# Patient Record
Sex: MALE | Race: WHITE | NOT HISPANIC OR LATINO | Employment: UNEMPLOYED | URBAN - NONMETROPOLITAN AREA
[De-identification: names, ages, dates, MRNs, and addresses within clinical notes are randomized per-mention and may not be internally consistent; named-entity substitution may affect disease eponyms.]

---

## 2017-08-25 ENCOUNTER — TELEPHONE (OUTPATIENT)
Dept: PAIN MEDICINE | Facility: CLINIC | Age: 27
End: 2017-08-25

## 2022-10-31 ENCOUNTER — APPOINTMENT (OUTPATIENT)
Dept: GENERAL RADIOLOGY | Facility: HOSPITAL | Age: 32
End: 2022-10-31

## 2022-10-31 ENCOUNTER — HOSPITAL ENCOUNTER (EMERGENCY)
Facility: HOSPITAL | Age: 32
Discharge: HOME | End: 2022-10-31
Admitting: STUDENT IN AN ORGANIZED HEALTH CARE EDUCATION/TRAINING PROGRAM

## 2022-10-31 VITALS
WEIGHT: 246 LBS | BODY MASS INDEX: 32.6 KG/M2 | HEART RATE: 91 BPM | HEIGHT: 73 IN | DIASTOLIC BLOOD PRESSURE: 87 MMHG | TEMPERATURE: 99.7 F | SYSTOLIC BLOOD PRESSURE: 134 MMHG | RESPIRATION RATE: 18 BRPM | OXYGEN SATURATION: 97 %

## 2022-10-31 DIAGNOSIS — J10.1 INFLUENZA A: Primary | ICD-10-CM

## 2022-10-31 LAB
FLUAV RNA RESP QL NAA+PROBE: DETECTED
FLUBV RNA RESP QL NAA+PROBE: NOT DETECTED
SARS-COV-2 RNA RESP QL NAA+PROBE: NOT DETECTED

## 2022-10-31 PROCEDURE — 71045 X-RAY EXAM CHEST 1 VIEW: CPT

## 2022-10-31 PROCEDURE — 87636 SARSCOV2 & INF A&B AMP PRB: CPT | Performed by: STUDENT IN AN ORGANIZED HEALTH CARE EDUCATION/TRAINING PROGRAM

## 2022-10-31 PROCEDURE — 99284 EMERGENCY DEPT VISIT MOD MDM: CPT

## 2022-10-31 RX ORDER — ACETAMINOPHEN 500 MG
1000 TABLET ORAL ONCE
Status: COMPLETED | OUTPATIENT
Start: 2022-10-31 | End: 2022-10-31

## 2022-10-31 RX ORDER — OSELTAMIVIR PHOSPHATE 75 MG/1
75 CAPSULE ORAL 2 TIMES DAILY
Qty: 10 CAPSULE | Refills: 0 | Status: SHIPPED | OUTPATIENT
Start: 2022-10-31 | End: 2022-11-05

## 2022-10-31 RX ORDER — KETOROLAC TROMETHAMINE 10 MG/1
10 TABLET, FILM COATED ORAL EVERY 6 HOURS PRN
Status: DISCONTINUED | OUTPATIENT
Start: 2022-10-31 | End: 2022-10-31 | Stop reason: HOSPADM

## 2022-10-31 RX ORDER — ACETAMINOPHEN 500 MG
1000 TABLET ORAL EVERY 6 HOURS PRN
Qty: 24 TABLET | Refills: 0 | Status: SHIPPED | OUTPATIENT
Start: 2022-10-31

## 2022-10-31 RX ORDER — ONDANSETRON 4 MG/1
4 TABLET, ORALLY DISINTEGRATING ORAL EVERY 6 HOURS PRN
Qty: 12 TABLET | Refills: 0 | Status: SHIPPED | OUTPATIENT
Start: 2022-10-31

## 2022-10-31 RX ADMIN — ACETAMINOPHEN 1000 MG: 500 TABLET ORAL at 19:52

## 2022-10-31 RX ADMIN — KETOROLAC TROMETHAMINE 10 MG: 10 TABLET, FILM COATED ORAL at 19:52

## 2022-11-01 NOTE — ED PROVIDER NOTES
"Subjective   History of Present Illness    Patient is a 32-year-old male presenting to ED with cough.  PMH significant for chronic low back pain.  Patient states 2 days ago he developed sudden onset of a productive cough, feeling of muscular pain in his back upon coughing, myalgias, as well as subjective fevers.  Patient reports that he is currently staying at [facility] where there are \"a lot of people sick but I am the worst of it.\"  Patient denies abdominal pain, nausea, vomiting, diarrhea, diaphoresis, or chills.  Patient denies use of any medications prior to arrival and presents at this time for further evaluation.    Records reviewed show patient was last seen in the ED on 1/11/2022 for COVID-19.    Patient followed up outpatient on 1/14/2022 the PCP office with no outpatient visits since.    Review of Systems   Constitutional: Positive for fever (Subjective). Negative for chills and diaphoresis.   HENT: Positive for congestion. Negative for sore throat.    Eyes: Negative.    Respiratory: Positive for cough.    Cardiovascular: Negative.    Gastrointestinal: Negative.  Negative for abdominal pain, diarrhea, nausea and vomiting.   Genitourinary: Negative.    Musculoskeletal: Positive for myalgias.   Skin: Negative.    Neurological: Negative.    Psychiatric/Behavioral: Negative.    All other systems reviewed and are negative.      Past Medical History:   Diagnosis Date   • Adult body mass index 30+     obesity    • Arthropathy of lumbar facet joint    • Backache    • Chronic low back pain    • Myofascial pain    • Tobacco dependence syndrome        Allergies   Allergen Reactions   • Codeine        Past Surgical History:   Procedure Laterality Date   • INJECTION OF MEDICATION  03/04/2015    Depo Medrol(1)   • INJECTION OF MEDICATION  03/04/2015    Tordol(1)       Family History   Problem Relation Age of Onset   • Hypertension Mother    • Hypertension Father        Social History     Socioeconomic History   • " Marital status: Single   Tobacco Use   • Smoking status: Every Day   • Smokeless tobacco: Never           Objective   Physical Exam  Vitals and nursing note reviewed.   Constitutional:       General: He is not in acute distress.     Appearance: Normal appearance. He is well-developed, well-groomed and overweight. He is not toxic-appearing or diaphoretic.   HENT:      Head: Normocephalic.      Mouth/Throat:      Mouth: Mucous membranes are moist.      Pharynx: Oropharynx is clear. Posterior oropharyngeal erythema present. No oropharyngeal exudate.   Eyes:      Conjunctiva/sclera: Conjunctivae normal.      Pupils: Pupils are equal, round, and reactive to light.   Cardiovascular:      Rate and Rhythm: Normal rate and regular rhythm.   Pulmonary:      Effort: Pulmonary effort is normal. No respiratory distress.      Breath sounds: Normal breath sounds. No wheezing, rhonchi or rales.   Chest:      Chest wall: No tenderness.   Abdominal:      General: Bowel sounds are normal.      Palpations: Abdomen is soft.      Tenderness: There is no abdominal tenderness.   Musculoskeletal:         General: Normal range of motion.      Cervical back: Normal range of motion and neck supple.   Lymphadenopathy:      Cervical: No cervical adenopathy.   Skin:     General: Skin is warm and dry.   Neurological:      Mental Status: He is alert and oriented to person, place, and time.      Gait: Gait normal.   Psychiatric:         Attention and Perception: Attention normal.         Mood and Affect: Mood normal.         Speech: Speech normal.         Behavior: Behavior normal. Behavior is cooperative.         Procedures           ED Course                                           MDM  Number of Diagnoses or Management Options     Amount and/or Complexity of Data Reviewed  Clinical lab tests: ordered and reviewed  Tests in the radiology section of CPT®: reviewed and ordered  Tests in the medicine section of CPT®: reviewed and ordered  Decide to  obtain previous medical records or to obtain history from someone other than the patient: yes  Review and summarize past medical records: yes    Patient Progress  Patient progress: improved      Patient is a 32-year-old male presenting to ED with cough.  PMH significant for chronic low back pain.  Influenza A positive.  COVID testing and influenza B-.  Chest x-ray showed no acute findings.  Patient was given a dose of Motrin and Toradol after which his initial temperature of 100.1 decreased and the remainder of his vital signs remained stable.  Patient was able to tolerate p.o. fluids without difficulty.  Educated patient on need for symptomatic treatment with rest, hydration, appropriate use of Motrin and Tylenol.  After shared decision making and reviewing risk, benefits, and alternatives patient wished to proceed with treatment with Tamiflu.  Advised need for follow-up with a primary care provider within the next 48 hours and provided patient information on local providers.  Discussed strict return precautions and need for immediate return to ED should he develop any new or worsening symptoms.  Patient is appreciative with no further questions, concerns, needs at this time and is stable for discharge.    Final diagnoses:   Influenza A       ED Disposition  ED Disposition     ED Disposition   Discharge    Condition   Stable    Comment   --             Provider, No Known  [facility] [id number]  [phone]    Schedule an appointment as soon as possible for a visit in 2 days      [facility] - [place] [id number]  [phone]  Schedule an appointment as soon as possible for a visit in 2 days      [facility]  [address] [place] [address] [id number] [phone]    As needed         Medication List      New Prescriptions    acetaminophen 500 MG tablet  Commonly known as: TYLENOL  Take 2 tablets by mouth Every 6 (Six) Hours As Needed for  Mild Pain.     ondansetron ODT 4 MG disintegrating tablet  Commonly known as: ZOFRAN-ODT  Place 1 tablet on the tongue Every 6 (Six) Hours As Needed for Nausea.     oseltamivir 75 MG capsule  Commonly known as: TAMIFLU  Take 1 capsule by mouth 2 (Two) Times a Day for 5 days.           Where to Get Your Medications      These medications were sent to [id number] - [facility]  [facility] [phone]  - [phone] [address] [address]    Phone: [phone]   · acetaminophen 500 MG tablet  · ondansetron ODT 4 MG disintegrating tablet  · oseltamivir 75 MG capsule          Ilya Ramos PA-C  10/31/22 2040

## 2022-11-01 NOTE — DISCHARGE INSTRUCTIONS
Today you are testing positive for influenza A.  Please use tylenol for fever, motrin for body aches, zofran for nausea, and complete the tamiflu in its entirety.  Please increase rest, increase hydration.  You will need to follow-up with your primary care provider or see the list below for available providers within the next 48 hours.  Should you develop any new or worsening symptoms please return to the ER for further evaluation.    Follow up with one of the [facility] physician groups below to setup primary care. If you have trouble making an appointment, please call the [facility] Nurse Line at [phone]    [facility] - [address]  [address] [phone]    [facility] - [place] [address], [address], [place] [address] [phone]    [facility] - [place] [address], [address], [place] [address] [phone]     [facility] ([facility]) - [place]  [address], [address], [place] [address] [phone]    [facility] - [address], [address] [phone]    [facility] - [address], [place] [address] [phone]    [facility] - [place]  [address] [place], [address] [phone]    [facility] - [place]  [address], [address] [phone]    [facility] - [place]  [address] [address] [phone]    [facility] - [place]  [address] [place] [address], [address], [place], [place], [address] [phone]